# Patient Record
Sex: MALE | Race: OTHER | NOT HISPANIC OR LATINO
[De-identification: names, ages, dates, MRNs, and addresses within clinical notes are randomized per-mention and may not be internally consistent; named-entity substitution may affect disease eponyms.]

---

## 2018-08-26 PROBLEM — Z00.00 ENCOUNTER FOR PREVENTIVE HEALTH EXAMINATION: Status: ACTIVE | Noted: 2018-08-26

## 2018-09-25 ENCOUNTER — APPOINTMENT (OUTPATIENT)
Dept: UROLOGY | Facility: CLINIC | Age: 78
End: 2018-09-25
Payer: COMMERCIAL

## 2018-09-25 ENCOUNTER — OUTPATIENT (OUTPATIENT)
Dept: OUTPATIENT SERVICES | Facility: HOSPITAL | Age: 78
LOS: 1 days | Discharge: HOME | End: 2018-09-25

## 2018-09-25 VITALS
HEIGHT: 69 IN | HEART RATE: 90 BPM | SYSTOLIC BLOOD PRESSURE: 98 MMHG | BODY MASS INDEX: 21.62 KG/M2 | DIASTOLIC BLOOD PRESSURE: 58 MMHG | WEIGHT: 146 LBS

## 2018-09-25 DIAGNOSIS — Z78.9 OTHER SPECIFIED HEALTH STATUS: ICD-10-CM

## 2018-09-25 DIAGNOSIS — N40.0 BENIGN PROSTATIC HYPERPLASIA WITHOUT LOWER URINARY TRACT SYMPMS: ICD-10-CM

## 2018-09-25 DIAGNOSIS — R97.20 ELEVATED PROSTATE SPECIFIC ANTIGEN [PSA]: ICD-10-CM

## 2018-09-25 DIAGNOSIS — C34.90 MALIGNANT NEOPLASM OF UNSPECIFIED PART OF UNSPECIFIED BRONCHUS OR LUNG: ICD-10-CM

## 2018-09-25 DIAGNOSIS — Z87.891 PERSONAL HISTORY OF NICOTINE DEPENDENCE: ICD-10-CM

## 2018-09-25 PROCEDURE — 99204 OFFICE O/P NEW MOD 45 MIN: CPT

## 2018-09-25 NOTE — HISTORY OF PRESENT ILLNESS
[FreeTextEntry1] : 79 yo man with history of elevated PSA - no labs for me to review\par Other office Note from Dr Rosa reviewed\par Wife provided the majority of the history as patient was somewhat confused\par Also had chemo and radiation for lung cancer\par has lung biopsy scheduled tomorrow \par no problems with urination\par no blood in urination and no burning with urination at the penis.\par on ROBBY - prostate about 40g no nodules\par

## 2018-09-25 NOTE — ASSESSMENT
[FreeTextEntry1] : 77 yo man with history of elevated PSA - no labs for me to review\par Other office Note from Dr Rosa reviewed\par Wife provided the majority of the history as patient was somewhat confused\par Also had chemo and radiation for lung cancer\par has lung biopsy scheduled tomorrow \par no problems with urination\par no blood in urination and no burning with urination at the penis.\par on ROBBY - prostate about 40g no nodules\par

## 2018-09-25 NOTE — PHYSICAL EXAM
[General Appearance - Well Developed] : well developed [Normal Appearance] : normal appearance [Well Groomed] : well groomed [General Appearance - In No Acute Distress] : no acute distress [Edema] : no peripheral edema [Exaggerated Use Of Accessory Muscles For Inspiration] : no accessory muscle use [Abdomen Soft] : soft [Abdomen Tenderness] : non-tender [Costovertebral Angle Tenderness] : no ~M costovertebral angle tenderness [Testes Mass (___cm)] : there were no testicular masses [No Prostate Nodules] : no prostate nodules [Prostate Size ___ gm] : prostate size [unfilled] gm [FreeTextEntry1] : unsteady gait [Skin Color & Pigmentation] : normal skin color and pigmentation [] : no rash [No Focal Deficits] : no focal deficits [Oriented To Time, Place, And Person] : oriented to person, place, and time [Affect] : the affect was normal [Mood] : the mood was normal [Not Anxious] : not anxious

## 2018-09-25 NOTE — REVIEW OF SYSTEMS
[Dysuria] : no dysuria [Incontinence] : no incontinence [Confused] : confusion [Convulsions] : no convulsions [Negative] : Endocrine

## 2018-09-25 NOTE — LETTER BODY
[Dear  ___] : Dear  [unfilled], [Consult Letter:] : I had the pleasure of evaluating your patient, [unfilled]. [Please see my note below.] : Please see my note below. [Consult Closing:] : Thank you very much for allowing me to participate in the care of this patient.  If you have any questions, please do not hesitate to contact me. [Sincerely,] : Sincerely, [FreeTextEntry3] : Fredrick Garcia MD\par Director of Male Sexual Dysfunction and Urologic Prosthetics

## 2018-09-26 LAB
PSA FREE FLD-MCNC: 5.9
PSA FREE SERPL-MCNC: 0.96 NG/ML
PSA SERPL-MCNC: 16.18 NG/ML

## 2018-10-15 ENCOUNTER — APPOINTMENT (OUTPATIENT)
Dept: UROLOGY | Facility: CLINIC | Age: 78
End: 2018-10-15

## 2018-10-29 ENCOUNTER — APPOINTMENT (OUTPATIENT)
Dept: UROLOGY | Facility: CLINIC | Age: 78
End: 2018-10-29
Payer: MEDICARE

## 2018-10-29 VITALS
BODY MASS INDEX: 21.62 KG/M2 | SYSTOLIC BLOOD PRESSURE: 115 MMHG | HEART RATE: 85 BPM | DIASTOLIC BLOOD PRESSURE: 71 MMHG | WEIGHT: 146 LBS | HEIGHT: 69 IN

## 2018-10-29 DIAGNOSIS — R97.20 ELEVATED PROSTATE, SPECIFIC ANTIGEN [PSA]: ICD-10-CM

## 2018-10-29 PROCEDURE — 99213 OFFICE O/P EST LOW 20 MIN: CPT

## 2018-10-29 RX ORDER — CIPROFLOXACIN HYDROCHLORIDE 500 MG/1
500 TABLET, FILM COATED ORAL
Qty: 2 | Refills: 0 | Status: ACTIVE | COMMUNITY
Start: 2018-10-29 | End: 1900-01-01

## 2018-10-29 NOTE — ASSESSMENT
[FreeTextEntry1] : 77 yo man with history of elevated PSA -did not bring labs\par On my repeat value of 16, with Free % = 5.9\par Other office Note from Dr Rosa reviewed\par Wife provided the majority of the history as patient was somewhat confused\par Also had chemo and radiation for lung cancer\par has lung biopsy scheduled but did not have it done yet. \par no problems with urination\par no blood in urination and no burning with urination at the penis.\par on ROBBY - prostate about 40g no nodules

## 2018-10-29 NOTE — HISTORY OF PRESENT ILLNESS
[FreeTextEntry1] : 79 yo man with history of elevated PSA -did not bring labs\par On my repeat value of 16, with Free % = 5.9\par Other office Note from Dr Rosa reviewed\par Wife provided the majority of the history as patient was somewhat confused\par Also had chemo and radiation for lung cancer\par has lung biopsy scheduled but did not have it done yet. \par no problems with urination\par no blood in urination and no burning with urination at the penis.\par on ROBBY - prostate about 40g no nodules

## 2018-10-29 NOTE — PHYSICAL EXAM
[General Appearance - Well Developed] : well developed [Normal Appearance] : normal appearance [Well Groomed] : well groomed [General Appearance - In No Acute Distress] : no acute distress [Abdomen Soft] : soft [Abdomen Tenderness] : non-tender [Costovertebral Angle Tenderness] : no ~M costovertebral angle tenderness [Testes Mass (___cm)] : there were no testicular masses [No Prostate Nodules] : no prostate nodules [Prostate Size ___ gm] : prostate size [unfilled] gm [Skin Color & Pigmentation] : normal skin color and pigmentation [Edema] : no peripheral edema [] : no respiratory distress [Exaggerated Use Of Accessory Muscles For Inspiration] : no accessory muscle use [Oriented To Time, Place, And Person] : oriented to person, place, and time [Affect] : the affect was normal [Mood] : the mood was normal [Not Anxious] : not anxious [FreeTextEntry1] : unsteady gait [No Focal Deficits] : no focal deficits

## 2018-12-14 ENCOUNTER — APPOINTMENT (OUTPATIENT)
Dept: UROLOGY | Facility: CLINIC | Age: 78
End: 2018-12-14

## 2019-01-02 ENCOUNTER — APPOINTMENT (OUTPATIENT)
Dept: UROLOGY | Facility: CLINIC | Age: 79
End: 2019-01-02